# Patient Record
Sex: FEMALE | Race: BLACK OR AFRICAN AMERICAN | NOT HISPANIC OR LATINO | Employment: UNEMPLOYED | RURAL
[De-identification: names, ages, dates, MRNs, and addresses within clinical notes are randomized per-mention and may not be internally consistent; named-entity substitution may affect disease eponyms.]

---

## 2021-08-02 ENCOUNTER — HOSPITAL ENCOUNTER (EMERGENCY)
Facility: HOSPITAL | Age: 21
Discharge: HOME | End: 2021-08-02
Payer: OTHER GOVERNMENT

## 2021-08-02 VITALS
RESPIRATION RATE: 16 BRPM | WEIGHT: 179 LBS | HEIGHT: 65 IN | SYSTOLIC BLOOD PRESSURE: 102 MMHG | HEART RATE: 88 BPM | DIASTOLIC BLOOD PRESSURE: 64 MMHG | TEMPERATURE: 100 F | OXYGEN SATURATION: 100 % | BODY MASS INDEX: 29.82 KG/M2

## 2021-08-02 DIAGNOSIS — U07.1 COVID-19: Primary | ICD-10-CM

## 2021-08-02 PROCEDURE — 99282 EMERGENCY DEPT VISIT SF MDM: CPT | Mod: ,,, | Performed by: REGISTERED NURSE

## 2021-08-02 PROCEDURE — 99281 EMR DPT VST MAYX REQ PHY/QHP: CPT

## 2021-08-02 PROCEDURE — 99282 PR EMERGENCY DEPT VISIT,LEVEL II: ICD-10-PCS | Mod: ,,, | Performed by: REGISTERED NURSE

## 2021-08-31 ENCOUNTER — OFFICE VISIT (OUTPATIENT)
Dept: OBSTETRICS AND GYNECOLOGY | Facility: CLINIC | Age: 21
End: 2021-08-31
Payer: OTHER GOVERNMENT

## 2021-08-31 VITALS
HEIGHT: 65 IN | SYSTOLIC BLOOD PRESSURE: 114 MMHG | BODY MASS INDEX: 30.49 KG/M2 | WEIGHT: 183 LBS | RESPIRATION RATE: 18 BRPM | HEART RATE: 96 BPM | OXYGEN SATURATION: 99 % | DIASTOLIC BLOOD PRESSURE: 75 MMHG | TEMPERATURE: 98 F

## 2021-08-31 DIAGNOSIS — N92.1 MENOMETRORRHAGIA: Primary | ICD-10-CM

## 2021-08-31 DIAGNOSIS — N93.8 DUB (DYSFUNCTIONAL UTERINE BLEEDING): ICD-10-CM

## 2021-08-31 DIAGNOSIS — D50.8 OTHER IRON DEFICIENCY ANEMIA: ICD-10-CM

## 2021-08-31 PROBLEM — D50.9 IRON DEFICIENCY ANEMIA: Status: ACTIVE | Noted: 2021-08-31

## 2021-08-31 LAB
BASOPHILS # BLD AUTO: 0.04 K/UL (ref 0–0.2)
BASOPHILS NFR BLD AUTO: 0.5 % (ref 0–1)
DHEA-S SERPL-MCNC: 47.1 ΜG/DL (ref 44–332)
DIFFERENTIAL METHOD BLD: ABNORMAL
EOSINOPHIL # BLD AUTO: 0.19 K/UL (ref 0–0.5)
EOSINOPHIL NFR BLD AUTO: 2.4 % (ref 1–4)
ERYTHROCYTE [DISTWIDTH] IN BLOOD BY AUTOMATED COUNT: 21 % (ref 11.5–14.5)
ESTRADIOL SERPL-MCNC: 79 PG/ML
FSH SERPL-ACNC: 3.6 MIU/ML (ref 1.7–134.8)
HCT VFR BLD AUTO: 34.4 % (ref 38–47)
HGB BLD-MCNC: 10.3 G/DL (ref 12–16)
IMM GRANULOCYTES # BLD AUTO: 0.07 K/UL (ref 0–0.04)
IMM GRANULOCYTES NFR BLD: 0.9 % (ref 0–0.4)
LH SERPL-ACNC: 16.6 MIU/ML
LYMPHOCYTES # BLD AUTO: 1.63 K/UL (ref 1–4.8)
LYMPHOCYTES NFR BLD AUTO: 21 % (ref 27–41)
MCH RBC QN AUTO: 23.6 PG (ref 27–31)
MCHC RBC AUTO-ENTMCNC: 29.9 G/DL (ref 32–36)
MCV RBC AUTO: 78.9 FL (ref 80–96)
MONOCYTES # BLD AUTO: 0.53 K/UL (ref 0–0.8)
MONOCYTES NFR BLD AUTO: 6.8 % (ref 2–6)
MPC BLD CALC-MCNC: 9.7 FL (ref 9.4–12.4)
NEUTROPHILS # BLD AUTO: 5.31 K/UL (ref 1.8–7.7)
NEUTROPHILS NFR BLD AUTO: 68.4 % (ref 53–65)
NRBC # BLD AUTO: 0 X10E3/UL
NRBC, AUTO (.00): 0 %
PLATELET # BLD AUTO: 685 K/UL (ref 150–400)
PROLACTIN SERPL-MCNC: 13.5 NG/ML
RBC # BLD AUTO: 4.36 M/UL (ref 4.2–5.4)
T4 FREE SERPL-MCNC: 1 NG/DL (ref 0.76–1.46)
TESTOST SERPL-MCNC: 21 NG/DL (ref 8–60)
TSH SERPL DL<=0.005 MIU/L-ACNC: 0.56 UIU/ML (ref 0.36–3.74)
WBC # BLD AUTO: 7.77 K/UL (ref 4.5–11)

## 2021-08-31 PROCEDURE — 82627 DHEA-SULFATE: ICD-10-PCS | Mod: ,,, | Performed by: CLINICAL MEDICAL LABORATORY

## 2021-08-31 PROCEDURE — 83001 ASSAY OF GONADOTROPIN (FSH): CPT | Mod: ,,, | Performed by: CLINICAL MEDICAL LABORATORY

## 2021-08-31 PROCEDURE — 84403 ASSAY OF TOTAL TESTOSTERONE: CPT | Mod: ,,, | Performed by: CLINICAL MEDICAL LABORATORY

## 2021-08-31 PROCEDURE — 83001 FOLLICLE STIMULATING HORMONE: ICD-10-PCS | Mod: ,,, | Performed by: CLINICAL MEDICAL LABORATORY

## 2021-08-31 PROCEDURE — 99203 PR OFFICE/OUTPT VISIT, NEW, LEVL III, 30-44 MIN: ICD-10-PCS | Mod: ,,, | Performed by: ADVANCED PRACTICE MIDWIFE

## 2021-08-31 PROCEDURE — 84443 ASSAY THYROID STIM HORMONE: CPT | Mod: GZ,,, | Performed by: CLINICAL MEDICAL LABORATORY

## 2021-08-31 PROCEDURE — 84403 TESTOSTERONE: ICD-10-PCS | Mod: ,,, | Performed by: CLINICAL MEDICAL LABORATORY

## 2021-08-31 PROCEDURE — 83002 ASSAY OF GONADOTROPIN (LH): CPT | Mod: ,,, | Performed by: CLINICAL MEDICAL LABORATORY

## 2021-08-31 PROCEDURE — 99203 OFFICE O/P NEW LOW 30 MIN: CPT | Mod: ,,, | Performed by: ADVANCED PRACTICE MIDWIFE

## 2021-08-31 PROCEDURE — 84439 T4, FREE: ICD-10-PCS | Mod: GZ,,, | Performed by: CLINICAL MEDICAL LABORATORY

## 2021-08-31 PROCEDURE — 82670 ASSAY OF TOTAL ESTRADIOL: CPT | Mod: ,,, | Performed by: CLINICAL MEDICAL LABORATORY

## 2021-08-31 PROCEDURE — 85025 CBC WITH DIFFERENTIAL: ICD-10-PCS | Mod: ,,, | Performed by: CLINICAL MEDICAL LABORATORY

## 2021-08-31 PROCEDURE — 36415 PR COLLECTION VENOUS BLOOD,VENIPUNCTURE: ICD-10-PCS | Mod: ,,, | Performed by: ADVANCED PRACTICE MIDWIFE

## 2021-08-31 PROCEDURE — 82627 DEHYDROEPIANDROSTERONE: CPT | Mod: ,,, | Performed by: CLINICAL MEDICAL LABORATORY

## 2021-08-31 PROCEDURE — 82670 ESTRADIOL: ICD-10-PCS | Mod: ,,, | Performed by: CLINICAL MEDICAL LABORATORY

## 2021-08-31 PROCEDURE — 36415 COLL VENOUS BLD VENIPUNCTURE: CPT | Mod: ,,, | Performed by: ADVANCED PRACTICE MIDWIFE

## 2021-08-31 PROCEDURE — 84439 ASSAY OF FREE THYROXINE: CPT | Mod: GZ,,, | Performed by: CLINICAL MEDICAL LABORATORY

## 2021-08-31 PROCEDURE — 84146 PROLACTIN: ICD-10-PCS | Mod: ,,, | Performed by: CLINICAL MEDICAL LABORATORY

## 2021-08-31 PROCEDURE — 84146 ASSAY OF PROLACTIN: CPT | Mod: ,,, | Performed by: CLINICAL MEDICAL LABORATORY

## 2021-08-31 PROCEDURE — 83002 LUTEINIZING HORMONE: ICD-10-PCS | Mod: ,,, | Performed by: CLINICAL MEDICAL LABORATORY

## 2021-08-31 PROCEDURE — 85025 COMPLETE CBC W/AUTO DIFF WBC: CPT | Mod: ,,, | Performed by: CLINICAL MEDICAL LABORATORY

## 2021-08-31 PROCEDURE — 84443 TSH: ICD-10-PCS | Mod: GZ,,, | Performed by: CLINICAL MEDICAL LABORATORY

## 2021-09-19 RX ORDER — LANOLIN ALCOHOL/MO/W.PET/CERES
1 CREAM (GRAM) TOPICAL 3 TIMES DAILY
Qty: 90 TABLET | Refills: 6 | Status: SHIPPED | OUTPATIENT
Start: 2021-09-19 | End: 2021-10-19

## 2024-06-05 ENCOUNTER — HOSPITAL ENCOUNTER (EMERGENCY)
Facility: HOSPITAL | Age: 24
Discharge: HOME | End: 2024-06-05
Payer: MEDICAID

## 2024-06-05 VITALS
RESPIRATION RATE: 18 BRPM | BODY MASS INDEX: 35 KG/M2 | HEIGHT: 64 IN | DIASTOLIC BLOOD PRESSURE: 75 MMHG | HEART RATE: 76 BPM | OXYGEN SATURATION: 99 % | SYSTOLIC BLOOD PRESSURE: 118 MMHG | WEIGHT: 205 LBS | TEMPERATURE: 98 F

## 2024-06-05 DIAGNOSIS — Z77.098 CHEMICAL EXPOSURE OF EYE: Primary | ICD-10-CM

## 2024-06-05 PROCEDURE — 99284 EMERGENCY DEPT VISIT MOD MDM: CPT | Mod: ,,,

## 2024-06-05 PROCEDURE — 63600175 PHARM REV CODE 636 W HCPCS

## 2024-06-05 PROCEDURE — 99284 EMERGENCY DEPT VISIT MOD MDM: CPT | Mod: 25

## 2024-06-05 PROCEDURE — 96372 THER/PROPH/DIAG INJ SC/IM: CPT

## 2024-06-05 RX ORDER — KETOROLAC TROMETHAMINE 30 MG/ML
30 INJECTION, SOLUTION INTRAMUSCULAR; INTRAVENOUS
Status: COMPLETED | OUTPATIENT
Start: 2024-06-05 | End: 2024-06-05

## 2024-06-05 RX ADMIN — KETOROLAC TROMETHAMINE 30 MG: 30 INJECTION, SOLUTION INTRAMUSCULAR at 01:06
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
## 2024-06-05 NOTE — DISCHARGE INSTRUCTIONS
- Continue to rinse eyes with cold water as needed  - Tylenol and Motrin as needed for pain  - Follow up with pcp as needed

## 2024-06-05 NOTE — ED NOTES
On arrival patient was unable to complete visual acuity due to not being able to open eyes.   After irrigation eyes visual acuity was performed.   Left eye 20/25    right eye 20/25   both eyes 20/20
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
## 2024-06-06 ENCOUNTER — TELEPHONE (OUTPATIENT)
Dept: EMERGENCY MEDICINE | Facility: HOSPITAL | Age: 24
End: 2024-06-06
Payer: MEDICAID

## 2024-06-07 ENCOUNTER — HOSPITAL ENCOUNTER (EMERGENCY)
Facility: HOSPITAL | Age: 24
Discharge: HOME | End: 2024-06-07
Payer: MEDICAID

## 2024-06-07 VITALS
BODY MASS INDEX: 35 KG/M2 | HEIGHT: 64 IN | SYSTOLIC BLOOD PRESSURE: 108 MMHG | RESPIRATION RATE: 19 BRPM | WEIGHT: 205 LBS | DIASTOLIC BLOOD PRESSURE: 76 MMHG | OXYGEN SATURATION: 99 % | TEMPERATURE: 98 F | HEART RATE: 74 BPM

## 2024-06-07 DIAGNOSIS — A59.9 TRICHOMONIASIS: Primary | ICD-10-CM

## 2024-06-07 DIAGNOSIS — N30.01 ACUTE CYSTITIS WITH HEMATURIA: ICD-10-CM

## 2024-06-07 LAB
BACTERIA #/AREA URNS HPF: ABNORMAL /HPF
BILIRUB UR QL STRIP: NEGATIVE
CLARITY UR: ABNORMAL
COLOR UR: YELLOW
GLUCOSE UR STRIP-MCNC: NEGATIVE MG/DL
HCG UR QL IA.RAPID: NEGATIVE
KETONES UR STRIP-SCNC: NEGATIVE MG/DL
LEUKOCYTE ESTERASE UR QL STRIP: ABNORMAL
MUCOUS THREADS #/AREA URNS HPF: ABNORMAL /HPF
NITRITE UR QL STRIP: NEGATIVE
PH UR STRIP: 6.5 PH UNITS
PROT UR QL STRIP: NEGATIVE
RBC # UR STRIP: ABNORMAL /UL
RBC #/AREA URNS HPF: ABNORMAL /HPF
SP GR UR STRIP: 1.02
SQUAMOUS #/AREA URNS LPF: ABNORMAL /LPF
TRICHOMONAS #/AREA URNS HPF: ABNORMAL /HPF
UROBILINOGEN UR STRIP-ACNC: 2 MG/DL
WBC #/AREA URNS HPF: ABNORMAL /HPF

## 2024-06-07 PROCEDURE — 81001 URINALYSIS AUTO W/SCOPE: CPT | Performed by: REGISTERED NURSE

## 2024-06-07 PROCEDURE — 99284 EMERGENCY DEPT VISIT MOD MDM: CPT | Mod: ,,, | Performed by: REGISTERED NURSE

## 2024-06-07 PROCEDURE — 99284 EMERGENCY DEPT VISIT MOD MDM: CPT

## 2024-06-07 PROCEDURE — 87086 URINE CULTURE/COLONY COUNT: CPT | Performed by: REGISTERED NURSE

## 2024-06-07 PROCEDURE — 81025 URINE PREGNANCY TEST: CPT | Performed by: REGISTERED NURSE

## 2024-06-07 RX ORDER — NITROFURANTOIN 25; 75 MG/1; MG/1
100 CAPSULE ORAL 2 TIMES DAILY
Qty: 10 CAPSULE | Refills: 0 | Status: SHIPPED | OUTPATIENT
Start: 2024-06-07 | End: 2024-06-12

## 2024-06-07 RX ORDER — METRONIDAZOLE 500 MG/1
500 TABLET ORAL EVERY 12 HOURS
Qty: 14 TABLET | Refills: 0 | Status: SHIPPED | OUTPATIENT
Start: 2024-06-07 | End: 2024-06-14

## 2024-06-07 NOTE — ED PROVIDER NOTES
Encounter Date: 6/7/2024       History     Chief Complaint   Patient presents with    Back Pain    Dysuria    Urinary Frequency     23 y-old AAF received to ED with complaint of low back pain, urinary frequency and dysuria. States that symptoms have been ongoing for greater than 1 week.       Review of patient's allergies indicates:   Allergen Reactions    Penicillins Other (See Comments)     Unknown, states her mother told her she was allergic     Past Medical History:   Diagnosis Date    Iron deficiency anemia 8/31/2021     Past Surgical History:   Procedure Laterality Date    APPENDECTOMY       Family History   Family history unknown: Yes     Social History     Tobacco Use    Smoking status: Never    Smokeless tobacco: Never   Substance Use Topics    Alcohol use: Never    Drug use: Never     Review of Systems   Constitutional: Negative.    HENT: Negative.     Eyes: Negative.    Respiratory: Negative.     Cardiovascular: Negative.    Gastrointestinal: Negative.    Endocrine: Negative.    Genitourinary:  Positive for difficulty urinating and dysuria.   Musculoskeletal:  Positive for back pain (low back pain without any CV angle tenderness).   Skin: Negative.    Allergic/Immunologic: Negative.    Hematological: Negative.    Psychiatric/Behavioral: Negative.         Physical Exam     Initial Vitals   BP Pulse Resp Temp SpO2   06/07/24 1547 06/07/24 1547 06/07/24 1552 06/07/24 1547 06/07/24 1547   108/76 74 19 98 °F (36.7 °C) 99 %      MAP       --                Physical Exam    Nursing note and vitals reviewed.  Constitutional: She appears well-developed and well-nourished.   HENT:   Head: Normocephalic and atraumatic.   Right Ear: External ear normal.   Left Ear: External ear normal.   Nose: Nose normal.   Mouth/Throat: Oropharynx is clear and moist.   Eyes: Conjunctivae are normal.   Neck: Neck supple.   Normal range of motion.  Cardiovascular:  Normal rate, regular rhythm, normal heart sounds and intact distal  pulses.           Pulmonary/Chest: Breath sounds normal.   Abdominal: Abdomen is soft. Bowel sounds are normal.   Musculoskeletal:         General: Normal range of motion.      Cervical back: Normal range of motion and neck supple.     Neurological: She is alert and oriented to person, place, and time. She has normal strength. GCS score is 15. GCS eye subscore is 4. GCS verbal subscore is 5. GCS motor subscore is 6.   Skin: Skin is warm and dry. Capillary refill takes less than 2 seconds.   Psychiatric: She has a normal mood and affect. Her behavior is normal. Judgment and thought content normal.         Medical Screening Exam   See Full Note    ED Course   Procedures  Labs Reviewed   URINALYSIS, REFLEX TO URINE CULTURE - Abnormal; Notable for the following components:       Result Value    Leukocytes, UA Trace (*)     Urobilinogen, UA 2.0 (*)     Blood, UA Trace-Intact (*)     All other components within normal limits   URINALYSIS, MICROSCOPIC - Abnormal; Notable for the following components:    WBC, UA 5-10 (*)     RBC, UA 5-10 (*)     Bacteria, UA Moderate (*)     Squamous Epithelial Cells, UA Many (*)     Mucus, UA Moderate (*)     Trichomonas, UA Few (*)     All other components within normal limits   HCG QUALITATIVE URINE - Normal   CULTURE, URINE          Imaging Results    None          Medications - No data to display  Medical Decision Making  23 y-old AAF received to ED with complaint of low back pain, urinary frequency and dysuria. States that symptoms have been ongoing for greater than 1 week.       Amount and/or Complexity of Data Reviewed  Labs: ordered.     Details: Trace leukocyte esterase, 5-10 WBC, 5-10 RBC, moderate bacteria and few trich in urine.     Risk  Risk Details: Discussed work up at length with patient including treatment recommendations and recommendation that she f/u with the health department as she will need further testing.                                       Clinical Impression:    Final diagnoses:  [A59.9] Trichomoniasis (Primary)  [N30.01] Acute cystitis with hematuria        ED Disposition Condition    Discharge Stable          ED Prescriptions       Medication Sig Dispense Start Date End Date Auth. Provider    metroNIDAZOLE (FLAGYL) 500 MG tablet Take 1 tablet (500 mg total) by mouth every 12 (twelve) hours. for 7 days 14 tablet 6/7/2024 6/14/2024 Jcarlos Infatne NP-C    nitrofurantoin, macrocrystal-monohydrate, (MACROBID) 100 MG capsule Take 1 capsule (100 mg total) by mouth 2 (two) times daily. for 5 days 10 capsule 6/7/2024 6/12/2024 Jcarlos Infante NP-C          Follow-up Information    None          Jcarlos Infante NP-C  06/07/24 8796

## 2024-06-07 NOTE — ED TRIAGE NOTES
PT ARRIVED TO ER WITH C/O LOWER BACK PAIN, URINARY PRESSURE/URGENCY, AND HA X 1 WEEK. HAS NO PCP.

## 2024-06-07 NOTE — Clinical Note
"Tran Delacruzkyle" Ben was seen and treated in our emergency department on 6/7/2024.  She may return to work on 06/08/2024.       If you have any questions or concerns, please don't hesitate to call.      Carlyn Multani RN    "

## 2024-06-07 NOTE — DISCHARGE INSTRUCTIONS
Follow up with the health department for further testing. Take medications as prescribed. Establish care with a regular doctor. Nothing in the Vagina until you have completed all antibiotics.

## 2024-06-09 LAB — UA COMPLETE W REFLEX CULTURE PNL UR: NORMAL

## 2024-06-13 LAB — HIV 1+2 AB+HIV1 P24 AG SERPL QL IA: NORMAL

## 2024-07-10 ENCOUNTER — HOSPITAL ENCOUNTER (EMERGENCY)
Facility: HOSPITAL | Age: 24
Discharge: HOME | End: 2024-07-10

## 2024-07-10 VITALS
WEIGHT: 206 LBS | DIASTOLIC BLOOD PRESSURE: 76 MMHG | SYSTOLIC BLOOD PRESSURE: 119 MMHG | BODY MASS INDEX: 35.17 KG/M2 | HEIGHT: 64 IN | HEART RATE: 95 BPM | RESPIRATION RATE: 18 BRPM | OXYGEN SATURATION: 98 % | TEMPERATURE: 98 F

## 2024-07-10 DIAGNOSIS — J01.10 ACUTE NON-RECURRENT FRONTAL SINUSITIS: Primary | ICD-10-CM

## 2024-07-10 PROCEDURE — 99283 EMERGENCY DEPT VISIT LOW MDM: CPT

## 2024-07-10 PROCEDURE — 99284 EMERGENCY DEPT VISIT MOD MDM: CPT | Mod: ,,, | Performed by: NURSE PRACTITIONER

## 2024-07-10 RX ORDER — CEFDINIR 300 MG/1
300 CAPSULE ORAL 2 TIMES DAILY
Qty: 20 CAPSULE | Refills: 0 | Status: SHIPPED | OUTPATIENT
Start: 2024-07-10 | End: 2024-07-20

## 2024-07-10 RX ORDER — FEXOFENADINE HCL AND PSEUDOEPHEDRINE HCI 60; 120 MG/1; MG/1
1 TABLET, EXTENDED RELEASE ORAL 2 TIMES DAILY
Qty: 20 TABLET | Refills: 0 | Status: SHIPPED | OUTPATIENT
Start: 2024-07-10 | End: 2024-07-20

## 2024-07-10 NOTE — ED PROVIDER NOTES
Encounter Date: 7/10/2024       History     Chief Complaint   Patient presents with    Headache    Sore Throat    Nasal Congestion    Cough     Patient presents today with complaint of sinus pressure with headache and sore throat.  Symptoms began 2 days ago got progressively worse today.  Denies fever nausea or vomiting.  Denies cough.  Reports significant sinus pressure.        Review of patient's allergies indicates:   Allergen Reactions    Penicillins Other (See Comments)     Unknown, states her mother told her she was allergic     Past Medical History:   Diagnosis Date    Iron deficiency anemia 8/31/2021     Past Surgical History:   Procedure Laterality Date    APPENDECTOMY       Family History   Family history unknown: Yes     Social History     Tobacco Use    Smoking status: Never    Smokeless tobacco: Never   Substance Use Topics    Alcohol use: Never    Drug use: Never     Review of Systems   Constitutional: Negative.    Respiratory: Negative.     Cardiovascular: Negative.    All other systems reviewed and are negative.      Physical Exam     Initial Vitals [07/10/24 1742]   BP Pulse Resp Temp SpO2   119/76 95 18 98.3 °F (36.8 °C) 98 %      MAP       --         Physical Exam    Nursing note and vitals reviewed.  Constitutional: She appears well-developed and well-nourished.   HENT:   Head: Normocephalic.   Tympanic membranes with Bilateral effusions, oropharynx grossly unremarkable, frontal sinus tenderness bilaterally   Neck:   Normal range of motion.  Cardiovascular:  Normal rate, regular rhythm, normal heart sounds and intact distal pulses.           No murmur heard.  Pulmonary/Chest: Breath sounds normal. No respiratory distress.   Musculoskeletal:         General: Normal range of motion.      Cervical back: Normal range of motion.     Neurological: She is alert and oriented to person, place, and time.   Skin: Skin is warm and dry.   Psychiatric: She has a normal mood and affect.         Medical  Screening Exam   See Full Note    ED Course   Procedures  Labs Reviewed - No data to display       Imaging Results    None          Medications - No data to display  Medical Decision Making  Risk  OTC drugs.  Prescription drug management.               ED Course as of 07/10/24 1804   Wed Jul 10, 2024   1756 Patient presents with sore throat, headache and sinus tenderness.  She has significant sinus tenderness in her frontal sinuses on exam.  Posterior pharynx does not show any signs of infection.  Bilateral ears noted to have effusions.  We will treat for acute sinusitis and have follow with PCP in 2-3 days [BC]      ED Course User Index  [BC] Joce Hutchins NP                           Clinical Impression:   Final diagnoses:  [J01.10] Acute non-recurrent frontal sinusitis (Primary)        ED Disposition Condition    Discharge Stable          ED Prescriptions       Medication Sig Dispense Start Date End Date Auth. Provider    cefdinir (OMNICEF) 300 MG capsule Take 1 capsule (300 mg total) by mouth 2 (two) times daily. for 10 days 20 capsule 7/10/2024 7/20/2024 Joce Hutchins NP    fexofenadine-pseudoephedrine  mg (ALLEGRA-D)  mg per tablet Take 1 tablet by mouth 2 (two) times daily. for 10 days 20 tablet 7/10/2024 7/20/2024 Joce Hutchins NP          Follow-up Information    None          Joce Hutchins NP  07/10/24 1805

## 2024-07-10 NOTE — ED TRIAGE NOTES
23 year old female presents to ed with c/o cough, nasal congestion, headache and sore throat x 2 days.  Patient reports treating symptoms with Coricidin

## 2024-07-10 NOTE — Clinical Note
"Tran "Lesli Contreras was seen and treated in our emergency department on 7/10/2024.  She may return to work on 07/11/2024.       If you have any questions or concerns, please don't hesitate to call.      Joce Hutchins, NP"

## 2024-07-10 NOTE — DISCHARGE INSTRUCTIONS
Follow-up with your primary care provider in 2-3 days.  Get antibiotics and decongestant field and take as directed.  Return to the emergency department for any concerns

## 2025-01-10 ENCOUNTER — OFFICE VISIT (OUTPATIENT)
Dept: FAMILY MEDICINE | Facility: CLINIC | Age: 25
End: 2025-01-10
Payer: MEDICAID

## 2025-01-10 VITALS
HEIGHT: 64 IN | WEIGHT: 210 LBS | SYSTOLIC BLOOD PRESSURE: 103 MMHG | DIASTOLIC BLOOD PRESSURE: 73 MMHG | HEART RATE: 77 BPM | TEMPERATURE: 99 F | OXYGEN SATURATION: 99 % | BODY MASS INDEX: 35.85 KG/M2

## 2025-01-10 DIAGNOSIS — Z13.1 SCREENING FOR DIABETES MELLITUS: ICD-10-CM

## 2025-01-10 DIAGNOSIS — N92.1 MENOMETRORRHAGIA: ICD-10-CM

## 2025-01-10 DIAGNOSIS — E66.9 OBESITY (BMI 30-39.9): ICD-10-CM

## 2025-01-10 DIAGNOSIS — Z11.59 ENCOUNTER FOR HEPATITIS C SCREENING TEST FOR LOW RISK PATIENT: ICD-10-CM

## 2025-01-10 DIAGNOSIS — Z13.220 SCREENING FOR LIPOID DISORDERS: ICD-10-CM

## 2025-01-10 DIAGNOSIS — N93.8 DYSFUNCTIONAL UTERINE BLEEDING: Primary | ICD-10-CM

## 2025-01-10 DIAGNOSIS — N93.9 ABNORMAL UTERINE BLEEDING: Primary | ICD-10-CM

## 2025-01-10 LAB
ALBUMIN SERPL BCP-MCNC: 3.7 G/DL (ref 3.5–5)
ALBUMIN/GLOB SERPL: 1 {RATIO}
ALP SERPL-CCNC: 69 U/L (ref 40–150)
ALT SERPL W P-5'-P-CCNC: <7 U/L
ANION GAP SERPL CALCULATED.3IONS-SCNC: 9 MMOL/L (ref 7–16)
ANISOCYTOSIS BLD QL SMEAR: NORMAL
AST SERPL W P-5'-P-CCNC: 17 U/L (ref 5–34)
BASOPHILS # BLD AUTO: 0.03 K/UL (ref 0–0.2)
BASOPHILS NFR BLD AUTO: 0.6 % (ref 0–1)
BILIRUB SERPL-MCNC: 0.4 MG/DL
BUN SERPL-MCNC: 8 MG/DL (ref 7–19)
BUN/CREAT SERPL: 10 (ref 6–20)
CALCIUM SERPL-MCNC: 8.9 MG/DL (ref 8.4–10.2)
CHLORIDE SERPL-SCNC: 109 MMOL/L (ref 98–107)
CHOLEST SERPL-MCNC: 151 MG/DL
CHOLEST/HDLC SERPL: 3.6 {RATIO}
CO2 SERPL-SCNC: 21 MMOL/L (ref 22–29)
CREAT SERPL-MCNC: 0.84 MG/DL (ref 0.55–1.02)
DIFFERENTIAL METHOD BLD: ABNORMAL
EGFR (NO RACE VARIABLE) (RUSH/TITUS): 99 ML/MIN/1.73M2
EOSINOPHIL # BLD AUTO: 0.04 K/UL (ref 0–0.5)
EOSINOPHIL NFR BLD AUTO: 0.8 % (ref 1–4)
ERYTHROCYTE [DISTWIDTH] IN BLOOD BY AUTOMATED COUNT: 16.1 % (ref 11.5–14.5)
EST. AVERAGE GLUCOSE BLD GHB EST-MCNC: 137 MG/DL
FERRITIN SERPL-MCNC: 7 NG/ML (ref 5–204)
FOLATE SERPL-MCNC: 8 NG/ML (ref 7–31.4)
GLOBULIN SER-MCNC: 3.6 G/DL (ref 2–4)
GLUCOSE SERPL-MCNC: 85 MG/DL (ref 74–100)
HBA1C MFR BLD HPLC: 6.4 %
HCT VFR BLD AUTO: 35.9 % (ref 38–47)
HCV AB SER QL: NORMAL
HDLC SERPL-MCNC: 42 MG/DL (ref 35–60)
HGB BLD-MCNC: 10.8 G/DL (ref 12–16)
HYPOCHROMIA BLD QL SMEAR: NORMAL
IMM GRANULOCYTES # BLD AUTO: 0.02 K/UL (ref 0–0.04)
IMM GRANULOCYTES NFR BLD: 0.4 % (ref 0–0.4)
IMM RETICS NFR: 24.9 % (ref 3–15.9)
IRON SATN MFR SERPL: 10 % (ref 20–50)
IRON SERPL-MCNC: 35 UG/DL (ref 50–170)
LDLC SERPL CALC-MCNC: 95 MG/DL
LDLC/HDLC SERPL: 2.3 {RATIO}
LYMPHOCYTES # BLD AUTO: 1.7 K/UL (ref 1–4.8)
LYMPHOCYTES NFR BLD AUTO: 36 % (ref 27–41)
MCH RBC QN AUTO: 23.8 PG (ref 27–31)
MCHC RBC AUTO-ENTMCNC: 30.1 G/DL (ref 32–36)
MCV RBC AUTO: 79.1 FL (ref 80–96)
MICROCYTES BLD QL SMEAR: NORMAL
MONOCYTES # BLD AUTO: 0.29 K/UL (ref 0–0.8)
MONOCYTES NFR BLD AUTO: 6.1 % (ref 2–6)
MPC BLD CALC-MCNC: 10.6 FL (ref 9.4–12.4)
NEUTROPHILS # BLD AUTO: 2.64 K/UL (ref 1.8–7.7)
NEUTROPHILS NFR BLD AUTO: 56.1 % (ref 53–65)
NONHDLC SERPL-MCNC: 109 MG/DL
NRBC # BLD AUTO: 0 X10E3/UL
NRBC, AUTO (.00): 0 %
PLATELET # BLD AUTO: 332 K/UL (ref 150–400)
PLATELET MORPHOLOGY: NORMAL
POTASSIUM SERPL-SCNC: 4.2 MMOL/L (ref 3.5–5.1)
PROT SERPL-MCNC: 7.3 G/DL (ref 6.4–8.3)
RBC # BLD AUTO: 4.54 M/UL (ref 4.2–5.4)
RETICS # AUTO: 0.08 X10E6/UL (ref 0.02–0.11)
RETICS/RBC NFR AUTO: 1.8 % (ref 0.4–2.2)
SODIUM SERPL-SCNC: 135 MMOL/L (ref 136–145)
TIBC SERPL-MCNC: 330 UG/DL (ref 70–310)
TIBC SERPL-MCNC: 365 UG/DL (ref 250–450)
TRANSFERRIN SERPL-MCNC: 325 MG/DL (ref 180–382)
TRIGL SERPL-MCNC: 72 MG/DL (ref 37–140)
TSH SERPL DL<=0.005 MIU/L-ACNC: 1.14 UIU/ML (ref 0.35–4.94)
VIT B12 SERPL-MCNC: 955 PG/ML (ref 213–816)
VLDLC SERPL-MCNC: 14 MG/DL
WBC # BLD AUTO: 4.72 K/UL (ref 4.5–11)

## 2025-01-10 RX ORDER — NORETHINDRONE 5 MG/1
5 TABLET ORAL 2 TIMES DAILY
Qty: 60 TABLET | Refills: 0 | Status: SHIPPED | OUTPATIENT
Start: 2025-01-10 | End: 2025-02-09

## 2025-01-10 NOTE — Clinical Note
Cj mann'am! Will you please link the HIV screening that was done on 6/12/2024 to her HIV care gap? Thank you!

## 2025-01-10 NOTE — PATIENT INSTRUCTIONS
Follow up with Dr. Kavin Flores OB/GYN as scheduled, her office located on the 4th floor of the [facility].

## 2025-01-10 NOTE — PROGRESS NOTES
[facility]    Lashay Celeste AGPCNP-BC [facility] [address] [place] [address]  [phone]          PATIENT NAME: Tran Contreras  : 2000  DATE: 1/10/25  MRN: [id number]          Reason for Visit        Chief Complaint   Patient presents with    [facility]     Presents to establish care. She is fasting today for labs. She is not on any daily routine medications.     Contraception     Patient requesting birth control. Reports she is currently on her cycle and it started on 10/28/2024. She does not see a gynecologist and has never had a pap. She does not have any children.        History of Present Illness        CHIEF COMPLAINT:  Patient presents today with concerns about abnormal uterine bleeding.    HISTORY OF PRESENT ILLNESS:  She has experienced abnormal uterine bleeding since . She presented to [place] in September for abnormal bleeding and was prescribed Provera. The medication temporarily resolved bleeding for 1-2 weeks before symptoms resumed. She received her first birth control injection 2 days ago.    MEDICAL HISTORY:  She reports one prior GYN visit in [place], [place] with ANA LILIA Carmen CNM.    FAMILY HISTORY:  She denies any family history of dysfunctional uterine bleeding.    SOCIAL HISTORY:  She works at the [employer].      ROS:  General: -fever, -chills, -fatigue, -weight gain, -weight loss  Eyes: -vision changes, -redness, -discharge  ENT: -ear pain, -nasal congestion, -sore throat  Cardiovascular: -chest pain, -palpitations, -lower extremity edema  Respiratory: -cough, -shortness of breath  Gastrointestinal: -abdominal pain, -nausea, -vomiting, +diarrhea, -constipation, -blood in stool  Genitourinary: -dysuria, -hematuria, -frequency  Musculoskeletal: -joint pain, -muscle pain  Skin: -rash, -lesion  Neurological: -headache, -dizziness, -numbness,  "-tingling  Psychiatric: -anxiety, -depression, -sleep difficulty          MEDICAL / SURGICAL / SOCIAL HISTORY     Past Medical History:   Diagnosis Date    Iron deficiency anemia 8/31/2021       Past Surgical History:   Procedure Laterality Date    APPENDECTOMY         Social History     Tobacco Use    Smoking status: Never    Smokeless tobacco: Never   Substance Use Topics    Alcohol use: Never    Drug use: Never     I personally reviewed all past medical, surgical, and social.     MEDICATIONS / ALLERGIES / HM     Current Outpatient Medications   Medication Sig Dispense Refill    norethindrone (AYGESTIN) 5 mg Tab Take 1 tablet (5 mg total) by mouth 2 (two) times daily. 60 tablet 0     No current facility-administered medications for this visit.       Review of patient's allergies indicates:   Allergen Reactions    Penicillins Other (See Comments)     Unknown, states her mother told her she was allergic         There is no immunization history on file for this patient.     Health Maintenance   Topic Date Due    Hepatitis C Screening  Never done    Lipid Panel  Never done    HIV Screening  Never done    HPV Vaccines (1 - 3-dose series) Never done    TETANUS VACCINE  Never done    Pap Smear  Never done    Influenza Vaccine (1) Never done    COVID-19 Vaccine (1 - 2024-25 season) Never done    RSV Vaccine (Age 60+ and Pregnant patients) (1 - 1-dose 75+ series) 11/16/2074    Pneumococcal Vaccines (Age 0-49)  Aged Out        Physical Exam      Vital Signs  Temp: 98.5 °F (36.9 °C)  Temp Source: Oral  Pulse: 77  SpO2: 99 %  BP: 103/73  BP Location: Right leg  Patient Position: Sitting  Pain Score: 0-No pain  Height and Weight  Height: 5' 4" (162.6 cm)  Weight: 95.3 kg (210 lb)  BSA (Calculated - sq m): 2.07 sq meters  BMI (Calculated): 36  Weight in (lb) to have BMI = 25: 145.3]    Physical Exam  Constitutional:       General: She is not in acute distress.     Appearance: She is obese. She is not ill-appearing.   HENT:      " "Head: Normocephalic.      Right Ear: Tympanic membrane, ear canal and external ear normal. There is no impacted cerumen.      Left Ear: Tympanic membrane, ear canal and external ear normal. There is no impacted cerumen.      Nose: No congestion or rhinorrhea.      Mouth/Throat:      Mouth: Mucous membranes are moist. Oral lesions (hard palate) present.      Pharynx: No posterior oropharyngeal erythema.     Cardiovascular:      Rate and Rhythm: Normal rate and regular rhythm.      Pulses: Normal pulses.      Heart sounds: Normal heart sounds.   Pulmonary:      Effort: Pulmonary effort is normal.      Breath sounds: Normal breath sounds.   Abdominal:      Palpations: Abdomen is soft.      Tenderness: There is no abdominal tenderness.   Skin:     General: Skin is warm and dry.   Neurological:      Mental Status: She is alert and oriented to person, place, and time.   Psychiatric:         Mood and Affect: Mood normal.         Behavior: Behavior normal.        Laboratory:    No results found for: "GLU", "NA", "K", "CL", "CO2", "BUN", "CREATININE", "CALCIUM", "PROT", "ALBUMIN", "BILITOT", "ALKPHOS", "AST", "ALT", "ANIONGAP", "ESTGFRAFRICA", "EGFRNONAA"    Lab Results   Component Value Date    WBC 7.77 08/31/2021    RBC 4.36 08/31/2021    HGB 10.3 (L) 08/31/2021    HCT 34.4 (L) 08/31/2021    MCV 78.9 (L) 08/31/2021    RDW 21.0 (H) 08/31/2021     (H) 08/31/2021        No results found for: "CHOL", "TRIG", "HDL", "LDLCALC", "TOTALCHOLEST"    Lab Results   Component Value Date    TSH 0.556 08/31/2021       No results found for: "HGBA1C", "ESTIMATEDAVG"     No results found for: "JBKBTTSR52"    No results found for: "KMOGGDUI62QO"      Point Of Care Testing:    Nitrites, UA   Date Value Ref Range Status   06/07/2024 Negative Negative Final     Urobilinogen, UA   Date Value Ref Range Status   06/07/2024 2.0 (A) 0.2, 1.0, Normal mg/dL Final     pH, UA   Date Value Ref Range Status   06/07/2024 6.5 5.0 to 8.0 pH Units " "Final     Specific Gravity, UA   Date Value Ref Range Status   06/07/2024 1.025 <=1.005, 1.010, 1.015, 1.020, 1.025, 1.030 Final     Ketones, UA   Date Value Ref Range Status   06/07/2024 Negative Negative mg/dL Final       No results found for: "[id number]", "FLUAMOLEC", "FLUBMOLEC", "MOLSTREPAPOC"    Assessment/Plan     Dysfunctional uterine bleeding  -     CBC Auto Differential; Future; Expected date: 01/10/2025  -     Comprehensive Metabolic Panel; Future; Expected date: 01/10/2025  -     Iron and TIBC; Future; Expected date: 01/10/2025  -     Ferritin; Future; Expected date: 01/10/2025  -     Vitamin B12 & Folate; Future; Expected date: 01/10/2025  -     Reticulocytes; Future; Expected date: 01/10/2025  -     Ambulatory referral/consult to Gynecology; Future; Expected date: 01/17/2025  -     TSH; Future; Expected date: 01/10/2025  -     norethindrone (AYGESTIN) 5 mg Tab; Take 1 tablet (5 mg total) by mouth 2 (two) times daily.  Dispense: 60 tablet; Refill: 0    Menometrorrhagia  -     CBC Auto Differential; Future; Expected date: 01/10/2025  -     Comprehensive Metabolic Panel; Future; Expected date: 01/10/2025  -     Iron and TIBC; Future; Expected date: 01/10/2025  -     Ferritin; Future; Expected date: 01/10/2025  -     Vitamin B12 & Folate; Future; Expected date: 01/10/2025  -     Reticulocytes; Future; Expected date: 01/10/2025  -     Ambulatory referral/consult to Gynecology; Future; Expected date: 01/17/2025  -     TSH; Future; Expected date: 01/10/2025  -     norethindrone (AYGESTIN) 5 mg Tab; Take 1 tablet (5 mg total) by mouth 2 (two) times daily.  Dispense: 60 tablet; Refill: 0    Encounter for hepatitis C screening test for low risk patient  -     Hepatitis C Antibody; Future; Expected date: 01/10/2025    Screening for lipoid disorders  -     Lipid Panel; Future; Expected date: 01/10/2025    Obesity (BMI 30-39.9)  -     Lipid Panel; Future; Expected date: 01/10/2025  -     TSH; Future; Expected " date: 01/10/2025  -     Hemoglobin A1C; Future; Expected date: 01/10/2025    Screening for diabetes mellitus  -     Hemoglobin A1C; Future; Expected date: 01/10/2025        Assessment & Plan    IMPRESSION:  - Consulted with Dr. Kavin Flores, OB/GYN, regarding patient's prolonged abnormal uterine bleeding  - Will initiate short-term medication therapy to control bleeding while awaiting further evaluation  - Considered potential underlying causes of dysfunctional uterine bleeding  - Noted patient's history of irregular menstrual cycles since 2021  - Reviewed previous ER visit labs from September, noting low potassium and presence of blood in urine  - Assessed for symptoms of anemia due to chronic blood loss    ABNORMAL UTERINE BLEEDING:  - Prescribed Aygestin 5 mg, 1 tablet twice daily for 30 days to possibly thin the endometrial lining and stop bleeding.  - Ordered labs to assess current blood counts.  - Ultrasound ordered by Dr. Kavin Flores, OB/GYN  - Scheduled follow-up with Dr. Jodie Flores, OB/GYN, in clinic next week for further evaluation.   - Noted the patient's ongoing bleeding issues since 2021, with recent recurrence after brief cessation.  - Acknowledged that the bleeding is not normal and consulted with gynecologist.  - Noted that Provera tablets prescribed in September temporarily stopped bleeding.      Future Appointments   Date Time Provider Department Center   1/14/2025  3:00 PM Kavin Flores MD [facility]   1/14/2025  3:00 PM [facility] OB US1 [facility] OB US [facility]   7/10/2025  8:00 AM Lashay Celeste NP [facility]       Workup results were reviewed and all questions were answered. Diagnosis and treatment options were discussed and the patient  is amenable with the overall treatment plan. Verbal and written discharge instructions were given including to return to clinic/ED with any acute worsening of symptoms or failure of symptoms to improve. The reasons for return to the  clinic/ED were explained in lay terms. No further intervention is warranted at this time. The patient agrees with the plan, expresses understanding, is hemodynamically stable and in no acute distress.     All questions answered to desired level of satisfaction    This note was generated with the assistance of ambient listening technology. Verbal consent was obtained by the patient and accompanying visitor(s) for the recording of patient appointment to facilitate this note. I attest to having reviewed and edited the generated note for accuracy, though some syntax or spelling errors may persist. Please contact the author of this note for any clarification.         DUNG Valdez-BC [facility]

## 2025-01-14 ENCOUNTER — PATIENT OUTREACH (OUTPATIENT)
Facility: HOSPITAL | Age: 25
End: 2025-01-14
Payer: MEDICAID

## 2025-01-14 NOTE — PROGRESS NOTES
01/14/2025   --Chart accessed for: Care gaps  --Care Gaps addressed: pap smear and labs  Care Everywhere updates requested and reviewed. Uploaded HIV  Next appointment 1/14/2025 . Appointment notes updated to include: discuss pap smear @ OB/GYN appt  Health Maintenance Due   Topic Date Due    HPV Vaccines (1 - 3-dose series) Never done    TETANUS VACCINE  Never done    Pap Smear  Never done    Influenza Vaccine (1) Never done    COVID-19 Vaccine (1 - 2024-25 season) Never done

## 2025-02-27 ENCOUNTER — PATIENT MESSAGE (OUTPATIENT)
Dept: FAMILY MEDICINE | Facility: CLINIC | Age: 25
End: 2025-02-27

## 2025-05-09 ENCOUNTER — PATIENT MESSAGE (OUTPATIENT)
Dept: FAMILY MEDICINE | Facility: CLINIC | Age: 25
End: 2025-05-09
Payer: COMMERCIAL

## 2025-05-14 ENCOUNTER — PATIENT MESSAGE (OUTPATIENT)
Dept: FAMILY MEDICINE | Facility: CLINIC | Age: 25
End: 2025-05-14
Payer: COMMERCIAL

## 2025-05-14 DIAGNOSIS — N93.8 DYSFUNCTIONAL UTERINE BLEEDING: Primary | ICD-10-CM

## 2025-08-12 ENCOUNTER — HOSPITAL ENCOUNTER (EMERGENCY)
Facility: HOSPITAL | Age: 25
Discharge: HOME | End: 2025-08-12

## 2025-08-12 VITALS
HEART RATE: 75 BPM | WEIGHT: 210 LBS | RESPIRATION RATE: 19 BRPM | SYSTOLIC BLOOD PRESSURE: 105 MMHG | HEIGHT: 64 IN | TEMPERATURE: 98 F | BODY MASS INDEX: 35.85 KG/M2 | DIASTOLIC BLOOD PRESSURE: 72 MMHG | OXYGEN SATURATION: 100 %

## 2025-08-12 DIAGNOSIS — J06.9 VIRAL URI WITH COUGH: Primary | ICD-10-CM

## 2025-08-12 PROCEDURE — 99281 EMR DPT VST MAYX REQ PHY/QHP: CPT

## 2025-08-12 PROCEDURE — 99282 EMERGENCY DEPT VISIT SF MDM: CPT | Mod: ,,, | Performed by: NURSE PRACTITIONER

## 2025-08-14 ENCOUNTER — TELEPHONE (OUTPATIENT)
Dept: EMERGENCY MEDICINE | Facility: HOSPITAL | Age: 25
End: 2025-08-14